# Patient Record
(demographics unavailable — no encounter records)

---

## 2024-11-04 NOTE — ASSESSMENT
[FreeTextEntry1] : #Xerosis Cutis - Take warm water baths. Avoid hot showers - Moisturize whole body 2-3x daily with thick cream (rather than lotion). Examples include OTC La Roche Posay, Vanicream, Cetaphil, Cerave  #Lichen simplex chronicus, RLE, mild - new dx of uncertain prognosis - Diagnosis, chronic nature, disease course, treatment options and goals of therapy discussed - Dry skin care - Start triamcinolone 0.1% ointment BID to affected areas for 2 weeks on, 1 week off cycles. Strong topical steroids should generally not be used on the face, in armpits, or in other skin folds, unless specifically directed otherwise. Overuse of steroids can lead to thinning of the skin, appearance of red blood vessels, or discoloration of the skin. Use only as directed.

## 2024-11-04 NOTE — PHYSICAL EXAM
[Alert] : alert [Oriented x 3] : ~L oriented x 3 [Well Nourished] : well nourished [Conjunctiva Non-injected] : conjunctiva non-injected [No Visual Lymphadenopathy] : no visual  lymphadenopathy [No Clubbing] : no clubbing [No Edema] : no edema [No Bromhidrosis] : no bromhidrosis [No Chromhidrosis] : no chromhidrosis [Declined] : declined [FreeTextEntry3] : Focused exam only (see below) per patient request:  moderate xerosis on legs lichenified hyperpigmented few papules on R lateral lower leg

## 2024-11-04 NOTE — HISTORY OF PRESENT ILLNESS
[FreeTextEntry1] : rash [de-identified] : Mr. SHAZIA AMADORTAMMI is a 23 year old M here for evaluation of below   #Rash R lower leg, itchy, xweeks. Maybe started as a bug bite.     Personal hx of skin cancer: no FHx of skin cancer: no Social Hx: studying to be  Referred by: Dr. MARCH,REFERRED

## 2024-11-18 NOTE — HISTORY OF PRESENT ILLNESS
[FreeTextEntry1] : Pt is a 23 year old M with a Hx childhood murmur presents with symptoms of palpitations.   He states that he was in his usual state of health till a few weeks ago when he started to have palpitations. He noticed it first on the way home from dropping his mom to work. He was at rest driving and all of a sudden felt like his heart was racing in his chest. He ended up making it home and the symptoms resolved on their own. Since then they have been happening almost everyday. He notices it most when he eats or when he comes out of the shower. He denies syncope, chest pain, or SOB.

## 2024-11-18 NOTE — ASSESSMENT
[FreeTextEntry1] : #Palpitations - Check TTE - Will perform 2 week Holter to assess for any abnormal arrhythmia   #Hx of Heart murmur - Pt reports hx of murmur and was advised to get echocardiogram.  - Plan for TTE

## 2025-01-06 NOTE — HISTORY OF PRESENT ILLNESS
[FreeTextEntry1] : Pt is a 23 year old M with a Hx childhood murmur presents with symptoms of palpitations.   He states that he was in his usual state of health till a few weeks ago when he started to have palpitations. He noticed it first on the way home from dropping his mom to work. He was at rest driving and all of a sudden felt like his heart was racing in his chest. He ended up making it home and the symptoms resolved on their own. Since then they have been happening almost everyday. He notices it most when he eats or when he comes out of the shower. He denies syncope, chest pain, or SOB.   Pt did do his Holter since his last visit and had symptoms while it was on, however, they were during an episode of sinus tach.

## 2025-01-06 NOTE — ASSESSMENT
[FreeTextEntry1] : #Palpitations - 2 week holter monitor performed.  - Overall did not  any significant arrythmias.  - Pt did have symptoms at a time during normal sinus.  - Continue to monitor. If symptoms get worse can consider longer term monitoring.   #Hx of Heart murmur - TTE within normal limits.